# Patient Record
(demographics unavailable — no encounter records)

---

## 2024-11-13 NOTE — HISTORY OF PRESENT ILLNESS
[de-identified] : CARTER TAI is a 79 year old patient With a more than 1 year history of constant bilateral nasal congestion.  It is worse on the right side.  He does not think that he has obstruction.  He has no sinus pain or pressure although he may have had a initially.  He has seen 3 other ENT physicians.  He has tried nasal saline, antihistamines, nasal steroid spray, and decongestant nasal sprays.  The symptoms are not seasonal.    No history of allergies or recurrent sinusitis He had nasal trauma in the past but no history of nasal or sinus surgery He does not smoke or have any pets He has not moved  CT scan August 2023-I was able to review the report.  There was a deviated septum with mild bilateral ethmoid sinus mucosal thickening.

## 2024-11-13 NOTE — ASSESSMENT
[FreeTextEntry1] : He has at least a 1 year history of chronic rhinitis with nasal congestion.  He does not report a lot of nasal drainage or obstruction. The etiology is unclear.  There is no preceding event.  He does not suffer from allergies.  On exam, he has a deviated septum.  The nasal mucosa edema is mildly edematous and appeared atrophic in some areas.  He denies a history of chronic topical decongestant use.  Plan -Findings and management options were discussed with the patient. - Humidifier and moisturizing nasal gel as needed - He may consider allergy evaluation - Trial of Atrovent nasal spray - He could consider nasal procedures if he has nasal obstruction.  However, he does not feel that he has significant obstruction. - I have asked him to call in approximately 2 weeks to let me know if he notices any improvement - He should call and return earlier if any problems or worsening symptoms

## 2024-11-13 NOTE — PHYSICAL EXAM
[TextEntry] : PHYSICAL EXAM  General: The patient was alert, oriented and in no distress. Voice was clear.  Face: The patient had no facial asymmetry or mass. The skin was unremarkable.  Ears: Hearing normal to conversational voice External ears were normal without deformity. Ear canals were clear. No cerumen or inflammation Tympanic membranes were intact and normal. No perforation or effusion. mobile  Nose:  The external nose had no significant deformity. On anterior rhinoscopy, the nasal mucosa was clear.  The anterior septum was grossly midline. There were no visualized polyps, purulence  or masses.  Cameron maneuver was negative for nasal valve collapse  Oral cavity: Oral mucosa- normal. Oral and base of tongue- clear and without mass. Gingival and buccal mucosa- moist and without lesions. Palate- the palate moved well. There was no cleft palate. There appeared to be good salivary flow.   Oral cavity/oropharynx- no pus, erythema or mass  Neck:  The neck was symmetrical. The parotid and submandibular glands were normal without masses. The trachea was midline and there was no unusual crepitus. Thyroid was smooth and nontender and no masses were palpated. No masses  Lymphatics: Cervical adenopathy- none.    PROCEDURE:  FLEXIBLE LARYNGOSCOPY, NASAL ENDOSCOPY   Surgeon: Dr. Baum Indication: Chronic rhinitis, inadequate exam on anterior rhinoscopy Anesthetic: Topical lidocaine and Afrin Procedure: The patient was placed in a sitting position.  Following application of the topical anesthetic and decongestant, exam was performed with a flexible telescope.  The scope was passed along the right nasal floor to the nasopharynx.  It was then passed into the region of the middle meatus, middle turbinate, and sphenoethmoid region.  An identical procedure was performed on the left side.  The following findings were noted:  Nasal mucosa: Mild edema with possible mild atrophy Septum: Deviated to the right with spur  Right nasal cavity      Inferior turbinate: Mildly enlarged      Middle turbinate: normal      Superior turbinate: normal      Inferior meatus: no pus, polyps or congestion      Middle meatus:  no pus, polyps or congestion       Superior meatus:  no pus, polyps, or congestion      Sphenoethmoidal recess: no pus, polyps or congestion   Left nasal cavity      Inferior turbinate: Mildly enlarged      Middle turbinate: normal      Superior turbinate: normal      Inferior meatus: no pus, polyps or congestion      Middle meatus: no pus, polyps, or congestion      Superior meatus:  no pus, polyps, or congestion      Sphenoethmoidal recess: no pus, polyps or congestion   Nasopharynx: no masses, choanae patent, no adenoid tissue  Base of tongue and vallecula: no masses or asymmetry.  Benign-appearing vallecular cyst Posterior pharyngeal wall: no masses.  There is a small polyp/cyst which appeared benign originating on the left lateral nasopharyngeal wall inferiorly. Hypopharynx: symmetrical. No masses Pyriform sinuses: no lesions or pooling of secretions Epiglottis: normal. No edema or lesions Aryepiglottic folds: normal. No lesions.  True vocal cords: clear and mobile. No lesions. Airway patent False vocal cords: normal Ventricles: no masses.  Arytenoids: Normal Interarytenoid area: no masses.  Normal Subglottis: normal. no masses

## 2025-02-03 NOTE — ASSESSMENT
[FreeTextEntry1] : He has a history of chronic rhinitis with constant nasal congestion which is right sided.  He does not typically feel like there was obstruction but he does have a deviated septum to that side.  That may be contributing to his symptoms.  He had symptoms of what he felt was an allergy attack last week.  There was drainage in the nasal cavity mostly on the right side and edema bilaterally.  A culture was taken to rule out bacterial infection.  He does not have sinus pain or pressure.  He does have a benign appearing cystic lesion at the junction of the left nasopharynx and hypopharynx.  It is unchanged on exam  Plan -Findings and management options were discussed with the patient. - Continue humidifier and moisturizing nasal gel - Nasal saline rinses as needed - Allergy medications as needed - he was asked to call for the culture results.  If it is positive, I will place him on antibiotics - Allergy evaluation recommended - I am going to send him for CT scan for further evaluation of the cystic lesion.  I like to ensure there is nothing more concerning. - He could consider septoplasty.  He would like to avoid surgery if possible - Follow-up after the CT scan - Call and return earlier if any problems

## 2025-02-03 NOTE — PHYSICAL EXAM
[TextEntry] : General: The patient was alert, oriented and in no distress. Voice was clear.  Face: The patient had no facial asymmetry or mass. The skin was unremarkable.  Ears: Hearing normal to conversational voice External ears were normal without deformity. Ear canals were clear. No cerumen or inflammation Tympanic membranes were intact and normal. No perforation or effusion. mobile  Nose: The external nose had no significant deformity. On anterior rhinoscopy, the nasal mucosa was clear. The anterior septum was grossly midline. There were no visualized polyps, purulence or masses. No nasal valve collapse  Oral cavity: Oral mucosa- normal. Oral and base of tongue- clear and without mass. Gingival and buccal mucosa- moist and without lesions. Palate- the palate moved well. There was no cleft palate. There appeared to be good salivary flow. Oral cavity/oropharynx- no pus, erythema or mass  Neck: The neck was symmetrical. The parotid and submandibular glands were normal without masses. The trachea was midline and there was no unusual crepitus. Thyroid was smooth and nontender and no masses were palpated. No masses  Lymphatics: Cervical adenopathy- none.    PROCEDURE: FLEXIBLE LARYNGOSCOPY, NASAL ENDOSCOPY  Surgeon: Dr. Baum Indication: Chronic rhinitis, assess for sinusitis, inadequate exam on anterior rhinoscopy Anesthetic: Topical lidocaine and Afrin Procedure: The patient was placed in a sitting position. Following application of the topical anesthetic and decongestant, exam was performed with a flexible telescope. The scope was passed along the right nasal floor to the nasopharynx. It was then passed into the region of the middle meatus, middle turbinate, and sphenoethmoid region. An identical procedure was performed on the left side. The following findings were noted:  Nasal mucosa: edema bilaterally Septum: Deviated to the right with spur  Right nasal cavity-mucus throughout.  Culture taken  Inferior turbinate: Mildly enlarged  Middle turbinate: normal  Superior turbinate: normal  Inferior meatus: no polyps or congestion  Middle meatus: edematous  Superior meatus: no polyps, or congestion  Sphenoethmoidal recess: no polyps or congestion  Left nasal cavity- stringy mucous  Inferior turbinate: Mildly enlarged  Middle turbinate: normal  Superior turbinate: normal  Inferior meatus: no pus, polyps or congestion  Middle meatus: no pus, polyps, or congestion  Superior meatus: no pus, polyps, or congestion  Sphenoethmoidal recess: no pus, polyps or congestion  Nasopharynx: no masses, choanae patent, no adenoid tissue  Base of tongue and vallecula: no masses or asymmetry. very small benign-appearing cyst in base of tongue Posterior pharyngeal wall: no masses. There is a small polyp/cyst which appeared benign originating on the left lateral nasopharyngeal wall inferiorly.- no change Hypopharynx: symmetrical. No masses Pyriform sinuses: no lesions or pooling of secretions Epiglottis: normal. No edema or lesions Aryepiglottic folds: normal. No lesions. True vocal cords: clear and mobile. No lesions. Airway patent False vocal cords: normal Ventricles: no masses. Arytenoids: Normal Interarytenoid area: no masses. Normal Subglottis: normal. no masses

## 2025-02-03 NOTE — HISTORY OF PRESENT ILLNESS
[de-identified] : 11/13/24-  CARTER TAI is a 79 year old patient With a more than 1 year history of constant bilateral nasal congestion. It is worse on the right side. He does not think that he has obstruction. He has no sinus pain or pressure although he may have had a initially. He has seen 3 other ENT physicians. He has tried nasal saline, antihistamines, nasal steroid spray, and decongestant nasal sprays. The symptoms are not seasonal.  No history of allergies or recurrent sinusitis He had nasal trauma in the past but no history of nasal or sinus surgery He does not smoke or have any pets He has not moved  CT scan August 2023-I was able to review the report. There was a deviated septum with mild bilateral ethmoid sinus mucosal thickening. - [FreeTextEntry1] : - 2/3/25- CARTER TAI is a 80 year old patient here for follow up for chronic rhinitis. He tried the Atrovent nasal spray but it did not help.  He continues to have right-sided nasal congestion.  It sometimes causes difficulty sleeping.  Last week he had what he felt was like an allergy attack.  He had sneezing, itchy eyes, sore throat, nasal drainage, and mucus.  He took Zyrtec and Nasacort.  They may have helped.  However, he does have dryness and crusting in the nose. He also tried taking medication for reflux but did not notice a difference.

## 2025-02-10 NOTE — HISTORY OF PRESENT ILLNESS
[Medical Office: ([facility])___] : at the medical office located in  [Telephone (audio)] : This telephonic visit was provided via audio only technology. [de-identified] :   11/13/24- CARTER TAI is a 79 year old patient With a more than 1 year history of constant bilateral nasal congestion. It is worse on the right side. He does not think that he has obstruction. He has no sinus pain or pressure although he may have had a initially. He has seen 3 other ENT physicians. He has tried nasal saline, antihistamines, nasal steroid spray, and decongestant nasal sprays. The symptoms are not seasonal.  No history of allergies or recurrent sinusitis He had nasal trauma in the past but no history of nasal or sinus surgery He does not smoke or have any pets He has not moved  CT scan August 2023-I was able to review the report. There was a deviated septum with mild bilateral ethmoid sinus mucosal thickening. -   Interval History: - 2/3/25- CARTER TAI is a 80 year old patient here for follow up for chronic rhinitis. He tried the Atrovent nasal spray but it did not help. He continues to have right-sided nasal congestion. It sometimes causes difficulty sleeping. Last week he had what he felt was like an allergy attack. He had sneezing, itchy eyes, sore throat, nasal drainage, and mucus. He took Zyrtec and Nasacort. They may have helped. However, he does have dryness and crusting in the nose. He also tried taking medication for reflux but did not notice a difference. - [FreeTextEntry1] : - 2/10/25- He had the CT scan of the neck.  We reviewed the results.  There is no cyst in the nasopharynx seen per the radiologist (on review of the films I see a possible area that could be a very small cyst/polyp) but he was found to have a pituitary tumor.  He said that the nasal congestion has resolved with the use of Zyrtec.  He said that he has no visual changes.  He said that hormonal testing done at [place] in the past was normal.   sinus culture was negative.

## 2025-02-10 NOTE — ASSESSMENT
[FreeTextEntry1] : He has a history of chronic rhinitis and a deviated septum.  The nasal congestion has resolved.  He has on exam a small polyp or cyst in the left lateral nasopharynx.  CT scan for evaluation did not reveal sinus inflammation or any obvious cyst.  However, he was found to have a pituitary tumor.  Plan -Findings and management options were discussed with the patient. - Continue Zyrtec and allergy medications as needed - I am going to refer him to Dr. D'Amico for further evaluation of the pituitary lesion - I will monitor his nasal and sinus symptoms

## 2025-02-12 NOTE — PHYSICAL EXAM
[General Appearance - Alert] : alert [General Appearance - Well-Appearing] : healthy appearing [Oriented To Time, Place, And Person] : oriented to person, place, and time [Impaired Insight] : insight and judgment were intact [Affect] : the affect was normal [Memory Recent] : recent memory was not impaired [Sclera] : the sclera and conjunctiva were normal [PERRL With Normal Accommodation] : pupils were equal in size, round, reactive to light, with normal accommodation [Extraocular Movements] : extraocular movements were intact [Neck Appearance] : the appearance of the neck was normal [] : no respiratory distress [Respiration, Rhythm And Depth] : normal respiratory rhythm and effort [Abnormal Walk] : normal gait [Skin Color & Pigmentation] : normal skin color and pigmentation [FreeTextEntry5] : CN II-XII grossly intact

## 2025-02-12 NOTE — DATA REVIEWED
[de-identified] :  	 EXAM: [id number] - CT NECK SOFT TISSUE IC  - ORDERED BY: BETH SHARPE   PROCEDURE DATE:  02/05/2025    INTERPRETATION:  CT EXAMINATION OF THE NECK  CLINICAL INDICATION: Pharyngeal cyst.  TECHNIQUE: Multidetector reformatted CT images of the neck were obtained following the intravenous administration of .  COMPARISON: None available.  FINDINGS:  Aerodigestive structures: Normal.  No evidence of abnormal enhancement.  Thyroid gland: Normal.  Parotid and submandibular glands:  Normal.  Lymph nodes: No pathologic adenopathy by imaging size criteria.  Vascular structures: Normal.  Osseous structures: No fracture, dislocation or destructive lesion.  Paranasal sinuses: Clear.  Mastoid air cells:  Clear.  Partially visualized intracranial structures: Sellar and suprasellar mass measuring about 1.8 x 2.5 x 1.6 cm (AP by transverse by craniocaudal.), With questionable dehiscence of the sellar floor.  Orbits: Globes and orbits are unremarkable. Mild elevation of the optic chiasm by the suprasellar portion of the tumor.  Partially visualized lung apices: Aberrant right subclavian artery.   IMPRESSION:  Cystic pharyngeal lesion described on the clinical note is not well appreciated on this exam.  Somewhat large sellar and suprasellar mass, with likely erosion of the floor of the sella, recommend MRI of the brain with pituitary protocol  --- End of Report ---

## 2025-02-12 NOTE — ASSESSMENT
[FreeTextEntry1] : This 80 male presented with an incidental pituitary mass discovered on a CT scan performed for nasal congestion. The mass appears eccentric and is located behind and around the carotid artery.  His nasal congestion has resolved with Zyrtec.  He has had regular thyroid hormone level checks from [id number].   An MRI of the sella and pituitary labs were ordered.    Dr. D'Amico independently reviewed all available images with patient.   PLAN: - Pituitary lab panel - MRI Sella w/wo with nicole protocol for further workup - RTC after labs/MRI for review    Patient verbalizes understanding of today's discussion and next steps in treatment plan.   Today, my ACP, Kathleen Ortiz, was here to observe my evaluation and management services for this patient to be followed going forward.    A total of 45 minutes was spent reviewing the labs, imaging and physical examination of the patient. We discussed the diagnosis, and the plan. The patient's questions were answered. The patient demonstrated an excellent understanding of the plan.

## 2025-02-12 NOTE — HISTORY OF PRESENT ILLNESS
[de-identified] : 79 y/o male, never smoker, with PMHx of HTN, HLD, BPH who presents for evaluation of pituitary lesion that was recently dx with ENT for workup of bilateral nasal congestion.   - As part of ENT workup for nasal congestion, he completed CT Neck soft tissues on 2/10/25 which showed incidental finding of a large sella/suprasella mass with likely erosion of the floor of the sella. Referred to neurosurgery.   Presents today for evaluation.  Nasal congestion has since resolved with Zyrtec.  Denies headaches, dizziness, vision changes.   ENT: Val Baum (referring physician) PCP: Karlene Castillo [phone]

## 2025-02-12 NOTE — HISTORY OF PRESENT ILLNESS
[de-identified] : 81 y/o male, never smoker, with PMHx of HTN, HLD, BPH who presents for evaluation of pituitary lesion that was recently dx with ENT for workup of bilateral nasal congestion.   - As part of ENT workup for nasal congestion, he completed CT Neck soft tissues on 2/10/25 which showed incidental finding of a large sella/suprasella mass with likely erosion of the floor of the sella. Referred to neurosurgery.   Presents today for evaluation.  Nasal congestion has since resolved with Zyrtec.  Denies headaches, dizziness, vision changes.   ENT: Val Baum (referring physician) PCP: Karleen Castillo [phone]

## 2025-02-12 NOTE — ASSESSMENT
[FreeTextEntry1] : This 80 male presented with an incidental pituitary mass discovered on a CT scan performed for nasal congestion. The mass appears eccentric and is located behind and around the carotid artery.  His nasal congestion has resolved with Zyrtec.  He has had regular thyroid hormone level checks from [id number].   An MRI of the sella and pituitary labs were ordered.    Dr. D'Amico independently reviewed all available images with patient.   PLAN: - Pituitary lab panel - MRI Sella w/wo with nicole protocol for further workup - RTC after labs/MRI for review    Patient verbalizes understanding of today's discussion and next steps in treatment plan.   Today, my ACP, Kathleen Ortiz, was here to observe my evaluation and management services for this patient to be followed going forward.    A total of 45 minutes was spent reviewing the labs, imaging and physical examination of the patient. We discussed the diagnosis, and the plan. The patient's questions were answered. The patient demonstrated an excellent understanding of the plan.

## 2025-04-15 NOTE — HISTORY OF PRESENT ILLNESS
[FreeTextEntry1] : 81 y/o male, never smoker, with PMHx of HTN, HLD, BPH who presented for evaluation of pituitary lesion that was dx with ENT for workup of bilateral nasal congestion.  - As part of ENT workup for nasal congestion, he completed CT Neck soft tissues on 2/10/25 which showed incidental finding of a large sella/suprasella mass with likely erosion of the floor of the sella. Referred to neurosurgery.  - 2/12/25 pt presented for initial evaluation. Nasal congestion resolved on zyrtec. Denied headaches, dizziness, vision changes. Patient ordered for pituitary labs and MRI with dedicated pituitary protocol.   - 2/12/25 labs notable for prolactin 20.2; FSH 23.9  - 4/8/25 MRI pituitary w/wo @ [facility] with report of  a 2.6 x 1.8 x 2.1 cm enhancing sellar/suprasellar mass compatible with pituitary macroadenoma. Stalk deviation to the left, contact of the inferior margin of the optic chiasm without gross compression and probable cavernous sinus involvement.  Returns TODAY for follow- up after completing labs/imaging.    ENT: Val Baum (referring physician) PCP: Karlene Castillo [phone]

## 2025-04-15 NOTE — DATA REVIEWED
[de-identified] : 	 Exam requested by: PRETTY [address]  [address] [facility] [address] SITE PERFORMED: [facility] SITE PHONE: [phone] Patient: CARTER TAI YOB: 1944 Phone: [phone] MRN: [id number] Acc: [id number] Date of Exam: 04-   EXAM:  MRI PITUITARY WITHOUT AND WITH CONTRAST  HISTORY: Follow-up for mass diagnosed on outside CT scan.   TECHNIQUE: Multiple pulse sequences were obtained in multiple projections prior to and following the intravenous administration of contrast.  IV Contrast:  18 ml of Clariscan was injected from a 20 ml single use vial.  COMPARISON: None.  FINDINGS: There is a homogeneously enhancing sellar/suprasellar mass present measuring 2.6 cm transverse dimension by 1.8 cm AP dimension by 2.1 cm CC dimension. The pituitary stalk is deviated toward the left. There is encasement of the cavernous internal carotid arteries, compatible with cavernous sinus involvement. The superior margin of the lesion abuts but does not distort the optic chiasm.  Within the posterior fossa the fourth ventricle is midline. There are multiple foci of FLAIR signal hyperintensity within the central yaw. There is an area of chronic arch and with encephalomalacia and gliosis in the left medial cerebellar hemisphere.  There is normal flow-void within the basilar artery. The craniocervical junction is normal.  The parasellar internal carotid arteries demonstrate normal flow void. There is no hydrocephalus, mass lesion, extra-axial collection or midline shift. FLAIR images demonstrate confluent areas of periventricular and subcortical white matter foci.   After contrast administration, no pathologic parenchymal or leptomeningeal enhancement is identified.  The skull and paranasal sinuses demonstrate mild mucosal thickening throughout the paranasal sinuses.   IMPRESSION:  1.   2.6 x 1.8 x 2.1 cm enhancing sellar/suprasellar mass compatible with pituitary macroadenoma. Stalk deviation to the left, contact of the inferior margin of the optic chiasm without gross compression and probable cavernous sinus involvement.  2.  Moderately advanced microvascular ischemic changes in the yaw and periventricular white matter.  3.  Chronic left medial cerebellar infarction.   Thank you for the opportunity to participate in the care of this patient.

## 2025-04-23 NOTE — HISTORY OF PRESENT ILLNESS
[FreeTextEntry1] : 81 y/o male, never smoker, with PMHx of HTN, HLD, BPH who presented for evaluation of pituitary lesion that was dx with ENT for workup of bilateral nasal congestion.  - As part of ENT workup for nasal congestion, he completed CT Neck soft tissues on 2/10/25 which showed incidental finding of a large sella/suprasella mass with likely erosion of the floor of the sella. Referred to neurosurgery.  - 2/12/25 pt presented for initial evaluation. Nasal congestion resolved on zyrtec. Denied headaches, dizziness, vision changes. Patient ordered for pituitary labs and MRI with dedicated pituitary protocol.   - 2/12/25 labs notable for prolactin 20.2; FSH 23.9  - 4/8/25 MRI pituitary w/wo @ [facility] with report of  a 2.6 x 1.8 x 2.1 cm enhancing sellar/suprasellar mass compatible with pituitary macroadenoma. Stalk deviation to the left, contact of the inferior margin of the optic chiasm without gross compression and probable cavernous sinus involvement.  Returns TODAY for follow- up after completing labs/imaging.  Denies headaches, dizziness, vision changes, abnormal hair growth/loss, or galactorrhea.  ENT: Val Baum (referring physician) PCP: Karlene Castillo [phone]

## 2025-04-23 NOTE — DATA REVIEWED
[de-identified] : 	 Exam requested by: PRETTY NO  [address] [facility] [address] SITE PERFORMED: [facility] SITE PHONE: [phone] Patient: CARTER TAI YOB: 1944 Phone: [phone] MRN: [id number] Acc: [id number] Date of Exam: 04-   EXAM:  MRI PITUITARY WITHOUT AND WITH CONTRAST  HISTORY: Follow-up for mass diagnosed on outside CT scan.   TECHNIQUE: Multiple pulse sequences were obtained in multiple projections prior to and following the intravenous administration of contrast.  IV Contrast:  18 ml of Clariscan was injected from a 20 ml single use vial.  COMPARISON: None.  FINDINGS: There is a homogeneously enhancing sellar/suprasellar mass present measuring 2.6 cm transverse dimension by 1.8 cm AP dimension by 2.1 cm CC dimension. The pituitary stalk is deviated toward the left. There is encasement of the cavernous internal carotid arteries, compatible with cavernous sinus involvement. The superior margin of the lesion abuts but does not distort the optic chiasm.  Within the posterior fossa the fourth ventricle is midline. There are multiple foci of FLAIR signal hyperintensity within the central yaw. There is an area of chronic arch and with encephalomalacia and gliosis in the left medial cerebellar hemisphere.  There is normal flow-void within the basilar artery. The craniocervical junction is normal.  The parasellar internal carotid arteries demonstrate normal flow void. There is no hydrocephalus, mass lesion, extra-axial collection or midline shift. FLAIR images demonstrate confluent areas of periventricular and subcortical white matter foci.   After contrast administration, no pathologic parenchymal or leptomeningeal enhancement is identified.  The skull and paranasal sinuses demonstrate mild mucosal thickening throughout the paranasal sinuses.   IMPRESSION:  1.   2.6 x 1.8 x 2.1 cm enhancing sellar/suprasellar mass compatible with pituitary macroadenoma. Stalk deviation to the left, contact of the inferior margin of the optic chiasm without gross compression and probable cavernous sinus involvement.  2.  Moderately advanced microvascular ischemic changes in the yaw and periventricular white matter.  3.  Chronic left medial cerebellar infarction.   Thank you for the opportunity to participate in the care of this patient.

## 2025-04-23 NOTE — DATA REVIEWED
[de-identified] : 	 Exam requested by: PRETTY NO  [address] [facility] [address] SITE PERFORMED: [facility] SITE PHONE: [phone] Patient: CARTER TAI YOB: 1944 Phone: [phone] MRN: [id number] Acc: [id number] Date of Exam: 04-   EXAM:  MRI PITUITARY WITHOUT AND WITH CONTRAST  HISTORY: Follow-up for mass diagnosed on outside CT scan.   TECHNIQUE: Multiple pulse sequences were obtained in multiple projections prior to and following the intravenous administration of contrast.  IV Contrast:  18 ml of Clariscan was injected from a 20 ml single use vial.  COMPARISON: None.  FINDINGS: There is a homogeneously enhancing sellar/suprasellar mass present measuring 2.6 cm transverse dimension by 1.8 cm AP dimension by 2.1 cm CC dimension. The pituitary stalk is deviated toward the left. There is encasement of the cavernous internal carotid arteries, compatible with cavernous sinus involvement. The superior margin of the lesion abuts but does not distort the optic chiasm.  Within the posterior fossa the fourth ventricle is midline. There are multiple foci of FLAIR signal hyperintensity within the central yaw. There is an area of chronic arch and with encephalomalacia and gliosis in the left medial cerebellar hemisphere.  There is normal flow-void within the basilar artery. The craniocervical junction is normal.  The parasellar internal carotid arteries demonstrate normal flow void. There is no hydrocephalus, mass lesion, extra-axial collection or midline shift. FLAIR images demonstrate confluent areas of periventricular and subcortical white matter foci.   After contrast administration, no pathologic parenchymal or leptomeningeal enhancement is identified.  The skull and paranasal sinuses demonstrate mild mucosal thickening throughout the paranasal sinuses.   IMPRESSION:  1.   2.6 x 1.8 x 2.1 cm enhancing sellar/suprasellar mass compatible with pituitary macroadenoma. Stalk deviation to the left, contact of the inferior margin of the optic chiasm without gross compression and probable cavernous sinus involvement.  2.  Moderately advanced microvascular ischemic changes in the yaw and periventricular white matter.  3.  Chronic left medial cerebellar infarction.   Thank you for the opportunity to participate in the care of this patient.

## 2025-04-23 NOTE — HISTORY OF PRESENT ILLNESS
[FreeTextEntry1] : 81 y/o male, never smoker, with PMHx of HTN, HLD, BPH who presented for evaluation of pituitary lesion that was dx with ENT for workup of bilateral nasal congestion.  - As part of ENT workup for nasal congestion, he completed CT Neck soft tissues on 2/10/25 which showed incidental finding of a large sella/suprasella mass with likely erosion of the floor of the sella. Referred to neurosurgery.  - 2/12/25 pt presented for initial evaluation. Nasal congestion resolved on zyrtec. Denied headaches, dizziness, vision changes. Patient ordered for pituitary labs and MRI with dedicated pituitary protocol.   - 2/12/25 labs notable for prolactin 20.2; FSH 23.9  - 4/8/25 MRI pituitary w/wo @ [facility] with report of  a 2.6 x 1.8 x 2.1 cm enhancing sellar/suprasellar mass compatible with pituitary macroadenoma. Stalk deviation to the left, contact of the inferior margin of the optic chiasm without gross compression and probable cavernous sinus involvement.  Returns TODAY for follow- up after completing labs/imaging.  Denies headaches, dizziness, vision changes, abnormal hair growth/loss, or galactorrhea.  ENT: Val Baum (referring physician) PCP: Karlene Castillo [phone]

## 2025-04-23 NOTE — ASSESSMENT
[FreeTextEntry1] : This 80-year-old male presented with a pituitary macroadenoma was incidentally discovered on CT scan of the sinuses.  MRI confirmed the presence of the macroadenoma, which extends into the right cavernous sinus but does not compress the optic nerves.  His prolactin level is mildly elevated.  He is asymptomatic from the adenoma. The plan is to monitor the adenoma with repeat MRI in six months.  He was advised to monitor for vision changes.  Dr. D'Amico independently reviewed all available images with patient.   PLAN: - Repeat MRI Pituitary protocol w/wo in 6 months (Oct 2025) with follow- up after for review, sooner if develops new complaints/ vision symptoms - Continue f/u with PCP in the interim for comprehensive care    Patient verbalizes understanding of today's discussion and next steps in treatment plan.   Today, my ACP, Kathleen Ortiz, was here to observe my evaluation and management services for this new problem/exacerbated condition to be followed going forward.        Patient appreciates and agrees with current plan. This note was generated using medical dictation software. A reasonable effort has been made for proofreading its contents, but typos may still remain. If there are any questions or points of clarification needed, please notify my office.   A total of 15 minutes was spent reviewing the labs, imaging and physical examination of the patient. We discussed the diagnosis, and the plan. The patient's questions were answered. The patient demonstrated an excellent understanding of the plan.

## 2025-04-23 NOTE — PHYSICAL EXAM
[General Appearance - Alert] : alert [General Appearance - In No Acute Distress] : in no acute distress [General Appearance - Well-Appearing] : healthy appearing [Oriented To Time, Place, And Person] : oriented to person, place, and time [Impaired Insight] : insight and judgment were intact [Affect] : the affect was normal [Memory Recent] : recent memory was not impaired [Sclera] : the sclera and conjunctiva were normal [Neck Appearance] : the appearance of the neck was normal [] : no respiratory distress [Respiration, Rhythm And Depth] : normal respiratory rhythm and effort [Abnormal Walk] : normal gait [Skin Color & Pigmentation] : normal skin color and pigmentation [Over the Past 2 Weeks, Have You Felt Down, Depressed, or Hopeless?] : 1.) Over the past 2 weeks, have you felt down, depressed, or hopeless? No [Over the Past 2 Weeks, Have You Felt Little Interest or Pleasure Doing Things?] : 2.) Over the past 2 weeks, have you felt little interest or pleasure doing things? No [FreeTextEntry5] : CN II-XII grossly intact

## 2025-05-14 NOTE — ASSESSMENT
[FreeTextEntry1] : He has a history of chronic rhinitis.  He has a small scab on the right anterior inferior septum.  There was no active bleeding.  He has a little bit of dryness.  The nasal congestion and obstruction have improved.  There is no obvious infection.  He has a stable nasopharyngeal lesion on the left side which is smooth and consistent with a polyp or cyst.  Plan -Findings and management options were discussed with the patient. - Nasal saline irrigations and moisturizing nasal gel as needed - Mupirocin ointment twice daily for 1 to 2 weeks - Allergy and sinus medications as needed - We will continue to monitor the nasopharyngeal lesion.  It does appear benign.  There is no obvious lesion seen on the CT scan. - He is going to follow-up with Dr. Amico to monitor the pituitary adenoma - Follow-up in 3 months or earlier if needed. I did ask him to return after 2 weeks if the crusting and scab in the right nasal cavity do not resolve

## 2025-05-14 NOTE — HISTORY OF PRESENT ILLNESS
[de-identified] : -  11/13/24- CARTER TAI is a 79 year old patient With a more than 1 year history of constant bilateral nasal congestion. It is worse on the right side. He does not think that he has obstruction. He has no sinus pain or pressure although he may have had a initially. He has seen 3 other ENT physicians. He has tried nasal saline, antihistamines, nasal steroid spray, and decongestant nasal sprays. The symptoms are not seasonal.  No history of allergies or recurrent sinusitis He had nasal trauma in the past but no history of nasal or sinus surgery He does not smoke or have any pets He has not moved  CT scan August 2023-I was able to review the report. There was a deviated septum with mild bilateral ethmoid sinus mucosal thickening. - Interval History: - 2/3/25- CARTER TAI is a 80 year old patient here for follow up for chronic rhinitis. He tried the Atrovent nasal spray but it did not help. He continues to have right-sided nasal congestion. It sometimes causes difficulty sleeping. Last week he had what he felt was like an allergy attack. He had sneezing, itchy eyes, sore throat, nasal drainage, and mucus. He took Zyrtec and Nasacort. They may have helped. However, he does have dryness and crusting in the nose. He also tried taking medication for reflux but did not notice a difference. -   Interval History: - 2/10/25- He had the CT scan of the neck. We reviewed the results. There is no cyst in the nasopharynx seen per the radiologist (on review of the films I see a possible area that could be a very small cyst/polyp) but he was found to have a pituitary tumor. He said that the nasal congestion has resolved with the use of Zyrtec. He said that he has no visual changes. He said that hormonal testing done at [facility] in the past was normal.  sinus culture was negative. - [FreeTextEntry1] : - 5/14/25- He is here for follow-up for chronic rhinitis and a left nasopharyngeal lesion/polyp.  He has some crusting and irritation in the right nasal cavity but the nasal obstruction and congestion have improved.  He uses nasal saline gel as needed.  He saw Dr. D'Amico for evaluation for the pituitary macroadenoma.  They are going to observe it for now since he is asymptomatic.

## 2025-05-14 NOTE — PHYSICAL EXAM
[TextEntry] : General: The patient was alert, oriented and in no distress. Voice was clear.  Face: The patient had no facial asymmetry or mass. The skin was unremarkable.  Ears: Hearing normal to conversational voice External ears were normal without deformity. Ear canals were clear. No cerumen or inflammation Tympanic membranes were intact and normal. No perforation or effusion. mobile  Nose: The external nose had no significant deformity. On anterior rhinoscopy, the nasal mucosa was clear. The anterior septum was grossly midline. There were no visualized polyps, purulence or masses. No nasal valve collapse  Oral cavity: Oral mucosa- normal. Oral and base of tongue- clear and without mass. Gingival and buccal mucosa- moist and without lesions. Palate- the palate moved well. There was no cleft palate. There appeared to be good salivary flow. Oral cavity/oropharynx- no pus, erythema or mass  Neck: The neck was symmetrical. The parotid and submandibular glands were normal without masses. The trachea was midline and there was no unusual crepitus. Thyroid was smooth and nontender and no masses were palpated. No masses  Lymphatics: Cervical adenopathy- none.    PROCEDURE: FLEXIBLE LARYNGOSCOPY, NASAL ENDOSCOPY  Surgeon: Dr. Baum Indication: Chronic rhinitis, assess for sinusitis, inadequate exam on anterior rhinoscopy Anesthetic: Topical lidocaine and Afrin Procedure: The patient was placed in a sitting position. Following application of the topical anesthetic and decongestant, exam was performed with a flexible telescope. The scope was passed along the right nasal floor to the nasopharynx. It was then passed into the region of the middle meatus, middle turbinate, and sphenoethmoid region. An identical procedure was performed on the left side. The following findings were noted:  Nasal mucosa: edema bilaterally with a scab on anterior inferior right septum.  No active bleeding.  There is also mild dryness on that side. Septum: Deviated to the right with spur  Right nasal cavity  Inferior turbinate: Mildly enlarged Middle turbinate: normal Superior turbinate: normal Inferior meatus: no polyps, pus or congestion Middle meatus: No polyps, pus, or congestion Superior meatus: no polyps, or congestion Sphenoethmoidal recess: no polyps, pus or congestion  Left nasal cavity- Inferior turbinate: Mildly enlarged Middle turbinate: normal Superior turbinate: normal Inferior meatus: no pus, polyps or congestion Middle meatus: no pus, polyps, or congestion Superior meatus: no pus, polyps, or congestion Sphenoethmoidal recess: no pus, polyps or congestion  Nasopharynx: no masses, choanae patent, no adenoid tissue  Base of tongue and vallecula: no masses or asymmetry. very small benign-appearing cyst in base of tongue- no change Posterior pharyngeal wall: no masses. There is a small polyp/cyst which appeared benign originating on the left lateral nasopharyngeal wall inferiorly.- no change Hypopharynx: symmetrical. No masses Pyriform sinuses: no lesions or pooling of secretions Epiglottis: normal. No edema or lesions Aryepiglottic folds: normal. No lesions. True vocal cords: clear and mobile. No lesions. Airway patent False vocal cords: normal Ventricles: no masses. Arytenoids: Normal Interarytenoid area: no masses. Normal Subglottis: normal. no masses